# Patient Record
Sex: MALE | Race: WHITE | NOT HISPANIC OR LATINO | Employment: OTHER | URBAN - METROPOLITAN AREA
[De-identification: names, ages, dates, MRNs, and addresses within clinical notes are randomized per-mention and may not be internally consistent; named-entity substitution may affect disease eponyms.]

---

## 2021-07-27 ENCOUNTER — OFFICE VISIT (OUTPATIENT)
Dept: NEUROSURGERY | Facility: CLINIC | Age: 46
End: 2021-07-27
Payer: MEDICARE

## 2021-07-27 VITALS
RESPIRATION RATE: 16 BRPM | DIASTOLIC BLOOD PRESSURE: 86 MMHG | SYSTOLIC BLOOD PRESSURE: 140 MMHG | HEART RATE: 91 BPM | TEMPERATURE: 97.6 F | BODY MASS INDEX: 41.52 KG/M2 | WEIGHT: 290 LBS | HEIGHT: 70 IN

## 2021-07-27 DIAGNOSIS — G62.9 NEUROPATHY: Primary | ICD-10-CM

## 2021-07-27 DIAGNOSIS — M54.50 LOWER BACK PAIN: ICD-10-CM

## 2021-07-27 PROBLEM — R20.0 NUMBNESS: Status: ACTIVE | Noted: 2021-07-27

## 2021-07-27 PROBLEM — G89.29 CHRONIC MIDLINE LOW BACK PAIN WITHOUT SCIATICA: Status: ACTIVE | Noted: 2021-07-27

## 2021-07-27 PROCEDURE — 99204 OFFICE O/P NEW MOD 45 MIN: CPT | Performed by: PHYSICIAN ASSISTANT

## 2021-07-27 RX ORDER — TRAMADOL HYDROCHLORIDE 50 MG/1
1 TABLET ORAL EVERY 6 HOURS PRN
COMMUNITY
Start: 2021-07-18

## 2021-07-27 RX ORDER — GABAPENTIN 600 MG/1
600 TABLET ORAL 3 TIMES DAILY
COMMUNITY
Start: 2021-07-01

## 2021-07-27 RX ORDER — IBUPROFEN 200 MG
200 TABLET ORAL EVERY 6 HOURS PRN
COMMUNITY

## 2021-07-27 NOTE — ASSESSMENT & PLAN NOTE
Given presentation of ascending numbness in all extremities etiology unclear  · Neurology consult placed  · EMG of bilateral upper and lower extremities ordered   · Discuss consideration for B12, folate and A1c  Patient denies history of diabetes but has not had any recent blood work

## 2021-07-27 NOTE — ASSESSMENT & PLAN NOTE
Presents as new patient for low back pain consult  · Progressively worsening back pain x 5 years from onset  · History of ALIF L5-S1 fusion in April of 2018  · Associated ascending numbness and paresthesia in upper extremities up to elbow and lower extremities up to mid calf  · Associated with pain limiting weakness in the left lower extremity  Imaging:   · MRI Lumbar spine w w/o contrast:  Multilevel degenerative changes  Most noted L3-4 and L4-5  Mild disc bulges with moderate facet hypertrophy  Bilateral foraminal stenosis  No significant central stenosis  Postoperative changes noted at L5-S1  Plan:   · Continue monitor neurological symptoms  · MRI imaging reviewed in detail with patient  Normal spinal anatomy reviewed  · Discuss etiology for patient's back pain likely multifactorial   Patient educated on facet arthropathy and facet syndrome which is likely contributing to his back pain  · Discussed weight management to help reduce the weight bearing load on his spine  · Discussed the establishment of a PCP provider to monitor blood levels of B12, ferritin, and HA1C to check for other causes of neuropathy  · Obtain lumbar flexion-extension x-rays to evaluate for any instability  · Recommend further follow up with pain management to discuss possible further interventions such as radioablation of the vertebral facet  · Recommended ongoing therapy exercises  Discuss consideration for aqua therapy  · Patient may follow up as a joint appointment in approximately six weeks after trial of conservative management and for review of lumbar flexion-extension x-rays

## 2021-07-27 NOTE — PROGRESS NOTES
Neurosurgery Office Note  Chase Curiel 48 y o  male MRN: [id number]      Assessment/Plan     Chronic midline low back pain without sciatica  Presents as new patient for low back pain consult  · Progressively worsening back pain x 5 years from onset  · History of ALIF L5-S1 fusion in April of 2018  · Associated ascending numbness and paresthesia in upper extremities up to elbow and lower extremities up to mid calf  · Associated with pain limiting weakness in the left lower extremity  Imaging:   · MRI Lumbar spine w w/o contrast:  Multilevel degenerative changes  Most noted L3-4 and L4-5  Mild disc bulges with moderate facet hypertrophy  Bilateral foraminal stenosis  No significant central stenosis  Postoperative changes noted at L5-S1  Plan:   · Continue monitor neurological symptoms  · MRI imaging reviewed in detail with patient  Normal spinal anatomy reviewed  · Discuss etiology for patient's back pain likely multifactorial   Patient educated on facet arthropathy and facet syndrome which is likely contributing to his back pain  · Discussed weight management to help reduce the weight bearing load on his spine  · Discussed the establishment of a PCP provider to monitor blood levels of B12, ferritin, and HA1C to check for other causes of neuropathy  · Obtain lumbar flexion-extension x-rays to evaluate for any instability  · Recommend further follow up with pain management to discuss possible further interventions such as radioablation of the vertebral facet  · Recommended ongoing therapy exercises  Discuss consideration for aqua therapy  · Patient may follow up as a joint appointment in approximately six weeks after trial of conservative management and for review of lumbar flexion-extension x-rays  Numbness  Given presentation of ascending numbness in all extremities etiology unclear      · Neurology consult placed  · EMG of bilateral upper and lower extremities ordered   · Discuss consideration for B12, folate and A1c  Patient denies history of diabetes but has not had any recent blood work  Diagnoses and all orders for this visit:    Neuropathy  -     EMG 2 Limb Upper Extremity; Future  -     EMG 2 limb lower extremity; Future  -     Ambulatory referral to Neurology; Future    Lower back pain  -     Ambulatory referral to Neurosurgery  -     Ambulatory referral to Pain Management; Future  -     X-ray lumbar spine flexion and extension only 4+ views; Future    Other orders  -     gabapentin (NEURONTIN) 600 MG tablet; Take 600 mg by mouth 3 (three) times a day  -     traMADol (ULTRAM) 50 mg tablet; Take 1 tablet by mouth every 6 (six) hours as needed  -     ibuprofen (MOTRIN) 200 mg tablet; Take 200 mg by mouth every 6 (six) hours as needed            CHIEF COMPLAINT    Chief Complaint   Patient presents with    Consult       HISTORY    History of Present Illness       Patient is a 48year old male without significant past medical history who presents today for a consult regarding his progressive lower back pain  His back pain started approximately 5 years ago when he slipped on ice in a freezer at working as a   He did not seek medical attention right after the incident hoping that his pain would resolve  He had followed with [facility] and ultimately underwent anterior L5-S1 vertebral fusion in April 2018  He tried returning to work a few months later in the beginning of 2019, but the back pain kept his from doing so  He has tried physical therapy and epidural steroid injections without relief  Currently, his low back pain is constant, sharp, rated 7/10 severity most days, provoked by movement  He takes 4 tramadol, 3 Advil, and 1 aleve a day to help relieve some of the pain  He has tried gabapentin 600 mg TID for the past couple of months and continues to note progressive numbness of his extremities   In the summer of 2019, he originally started with mild tingling of his toes and fingertips  This progressed to numbness and tingling in both of his hands the whole way up to his elbows, and in both of his feet the whole way up to his mid calf  He states that he is not aware of any specific events that exacerbated these symptoms  These episodes of numbness happen sporadically a couple of times and hour and there is nothing to do to alleviate the feeling  He denies pain in his thighs, upper arms, upper back and abdomen  Denies headache, constipation, diarrhea, incontinence, saddle anesthesia, or difficulty controlling bowel/bladder function  He states that the pain has been pretty consistent and the numbness is seeming to get worse  REVIEW OF SYSTEMS    Review of Systems   Constitutional: Negative  HENT: Negative  Eyes: Negative  Respiratory: Negative  Cardiovascular: Negative  Gastrointestinal: Negative  Negative for constipation and diarrhea  Endocrine: Negative  Genitourinary: Negative  Negative for difficulty urinating  Musculoskeletal: Positive for back pain (mid to lower back pain down into bilateral hips and legs), gait problem (sometimes feels unsteady, legs will give out on him) and myalgias (muscle pain in his back and bilateral legs, muscle pain in arms and hands)  Negative for neck pain and neck stiffness  Skin: Negative  Allergic/Immunologic: Negative  Neurological: Positive for weakness (legs will give out on him sometimes  ) and numbness (legs feel like they want to fall asleep but now its just pain  numbness in hands up into his elbow)  Negative for dizziness, tremors, seizures and headaches  Psychiatric/Behavioral: Negative  I have reviewed and agreed with the ROS      Meds/Allergies     Current Outpatient Medications   Medication Sig Dispense Refill    gabapentin (NEURONTIN) 600 MG tablet Take 600 mg by mouth 3 (three) times a day      ibuprofen (MOTRIN) 200 mg tablet Take 200 mg by mouth every 6 (six) hours as needed      traMADol (ULTRAM) 50 mg tablet Take 1 tablet by mouth every 6 (six) hours as needed       No current facility-administered medications for this visit  No Known Allergies    PAST HISTORY    History reviewed  No pertinent past medical history  Past Surgical History:   Procedure Laterality Date    LUMBAR FUSION  2017    at [facility]       Social History     Tobacco Use    Smoking status: Current Every Day Smoker     Types: Cigars    Smokeless tobacco: Current User     Types: Chew    Tobacco comment: about 5 cigars a day   Vaping Use    Vaping Use: Never used   Substance Use Topics    Alcohol use: Not Currently    Drug use: Yes     Types: Marijuana     Comment: medical marijuana       History reviewed  No pertinent family history  Above history personally reviewed  EXAM    Vitals:Blood pressure 140/86, pulse 91, temperature 97 6 °F (36 4 °C), temperature source Tympanic Core, resp  rate 16, height 5' 10" (1 778 m), weight 132 kg (290 lb)  ,Body mass index is 41 61 kg/m²  Physical Exam  Constitutional:       General: He is not in acute distress  Appearance: Normal appearance  He is well-developed  He is not ill-appearing  HENT:      Head: Normocephalic and atraumatic  Right Ear: External ear normal       Left Ear: External ear normal    Eyes:      General: No scleral icterus  Right eye: No discharge  Left eye: No discharge  Conjunctiva/sclera: Conjunctivae normal    Cardiovascular:      Rate and Rhythm: Normal rate  Pulmonary:      Effort: Pulmonary effort is normal  No respiratory distress  Musculoskeletal:         General: Tenderness (diffuse lumbar spine midline and bilateral paraspinal) present  Cervical back: Normal range of motion and neck supple  Skin:     General: Skin is warm and dry  Neurological:      Mental Status: He is alert        Coordination: Finger-Nose-Finger Test normal       Gait: Gait is intact  Deep Tendon Reflexes:      Reflex Scores:       Bicep reflexes are 2+ on the right side and 2+ on the left side  Brachioradialis reflexes are 2+ on the right side and 2+ on the left side  Patellar reflexes are 2+ on the right side and 2+ on the left side  Psychiatric:         Mood and Affect: Mood normal          Speech: Speech normal          Behavior: Behavior normal          Thought Content: Thought content normal          Judgment: Judgment normal          Neurologic Exam     Mental Status   Follows 3 step commands  Attention: normal  Concentration: normal    Speech: speech is normal   Level of consciousness: alert  Knowledge: good  Normal comprehension  Cranial Nerves     CN III, IV, VI   Conjugate gaze: present    CN VII   Facial expression full, symmetric  CN VIII   Hearing: intact    Motor Exam   Muscle bulk: normal  Right arm tone: normal  Left arm tone: normal  Right arm pronator drift: absent  Left arm pronator drift: absent  Right leg tone: normal  Left leg tone: normal    Strength   Right deltoid: 5/5  Left deltoid: 5/5  Right biceps: 5/5  Left biceps: 5/5  Right triceps: 5/5  Left triceps: 5/5  Right quadriceps: 5/5  Left quadriceps: 4/5  Right hamstrin/5  Left hamstrin/5  Right anterior tibial: 5/5  Left anterior tibial: 5/5  Right posterior tibial: 5/5  Left posterior tibial: 5/5  Right gastroc: 5/5  Left gastroc: 5/5  Mild left lower extremity pain inhibition - HF 4/5      Sensory Exam   Right arm light touch: normal  Left arm light touch: normal  Right leg light touch: normal  Left leg light touch: normal  Right arm vibration: normal  Left arm vibration: normal  Right leg vibration: normal  Left leg vibration: normal  Right arm pinprick: normal  Left arm pinprick: normal  Right leg pinprick: normal  Left leg pinprick: normal  Can localize location of vibratory sensation        Gait, Coordination, and Reflexes     Gait  Gait: normal    Coordination Finger to nose coordination: normal    Tremor   Resting tremor: absent  Intention tremor: absent  Action tremor: absent    Reflexes   Right brachioradialis: 2+  Left brachioradialis: 2+  Right biceps: 2+  Left biceps: 2+  Right patellar: 2+  Left patellar: 2+  Right : 2+  Left : 2+  Right Pat: absent  Left Pat: absent  Right ankle clonus: absent  Left pendular knee jerk: absent        MEDICAL DECISION MAKING    Imaging Studies:     MRI lumbar spine with without contrast November 2, 2020    I have personally reviewed pertinent reports     and I have personally reviewed pertinent films in PACS

## 2021-07-30 ENCOUNTER — TELEPHONE (OUTPATIENT)
Dept: NEUROSURGERY | Facility: CLINIC | Age: 46
End: 2021-07-30

## 2021-07-30 NOTE — TELEPHONE ENCOUNTER
I called back good shep spoke with lluvia [phone]  gave them pt phone number and medicare info she will put in computer - I dont know what they have from us  because all demos are on emg referral advsed they can call back if anything else is needed _BA

## 2021-07-30 NOTE — TELEPHONE ENCOUNTER
Sid shesharlene called asking for demographics for pt for emg - I advised it was all on script - wes checked pt out they received fax order from us but states none of pt info was on it ?  but states no info on script ?  I offered to give her pt number etc she declined and states she will call back -ba

## 2021-08-27 ENCOUNTER — CONSULT (OUTPATIENT)
Dept: PAIN MEDICINE | Facility: CLINIC | Age: 46
End: 2021-08-27
Payer: MEDICARE

## 2021-08-27 VITALS
BODY MASS INDEX: 41.09 KG/M2 | DIASTOLIC BLOOD PRESSURE: 78 MMHG | SYSTOLIC BLOOD PRESSURE: 122 MMHG | WEIGHT: 287 LBS | HEART RATE: 90 BPM | HEIGHT: 70 IN

## 2021-08-27 DIAGNOSIS — R20.2 NUMBNESS AND TINGLING IN BOTH HANDS: ICD-10-CM

## 2021-08-27 DIAGNOSIS — R20.0 NUMBNESS AND TINGLING IN BOTH HANDS: ICD-10-CM

## 2021-08-27 DIAGNOSIS — M51.36 DDD (DEGENERATIVE DISC DISEASE), LUMBAR: ICD-10-CM

## 2021-08-27 DIAGNOSIS — M48.061 LUMBAR FORAMINAL STENOSIS: ICD-10-CM

## 2021-08-27 DIAGNOSIS — M54.40 LOW BACK PAIN WITH SCIATICA, SCIATICA LATERALITY UNSPECIFIED, UNSPECIFIED BACK PAIN LATERALITY, UNSPECIFIED CHRONICITY: ICD-10-CM

## 2021-08-27 DIAGNOSIS — M47.816 LUMBAR SPONDYLOSIS: ICD-10-CM

## 2021-08-27 DIAGNOSIS — G89.4 CHRONIC PAIN SYNDROME: ICD-10-CM

## 2021-08-27 DIAGNOSIS — M54.16 LUMBAR RADICULOPATHY: Primary | ICD-10-CM

## 2021-08-27 PROCEDURE — 99204 OFFICE O/P NEW MOD 45 MIN: CPT | Performed by: ANESTHESIOLOGY

## 2021-08-27 NOTE — PROGRESS NOTES
Assessment  1  Lumbar radiculopathy    2  Low back pain with sciatica, sciatica laterality unspecified, unspecified back pain laterality, unspecified chronicity    3  Lumbar foraminal stenosis    4  Numbness and tingling in both hands    5  Lumbar spondylosis    6  DDD (degenerative disc disease), lumbar    7  Chronic pain syndrome        Plan   53-year-old male with a history of L5-S1 fusion, referred by ALVIN Abreu PA-C of Neurosurgery, presenting for initial consultation regarding lumbosacral back pain that radiates into bilateral lower extremities with associated numbness, paresthesias, and subjective weakness to the feet  He also complains of numbness and paresthesias as well in the upper extremities  He denies any neck pain  He has been dealing with the symptoms for about 6-1/2 years  MRI of the lumbar spine shows stable postoperative changes at L5-S1  He has multilevel degenerative disc disease and facet arthrosis  Bilateral foraminal stenosis noted at L4-5 and L5-S1  The patient did have an EMG of his upper extremities and 2020 which showed carpal tunnel syndrome on the right  Neurosurgery did order EMG of the upper and lower extremities which he is in the process of scheduling  The patient does follow with Dr Radha King at [facility] for pain management  He has received numerous epidural steroid injections which do provide some transient relief  Physical therapy has not provided any relief  The patient does take tramadol 50 mg q 6 hours p r n  and gabapentin 600 mg t i d  which provides some relief  He also uses medical marijuana with some relief  The patient's low back pain seems to be multifactorial including myofascial and facet mediated components  No evidence of sacroiliitis  Lower extremity symptoms do appear to be radicular in nature although there may be a component of peripheral neuropathy as well  Upper extremity symptoms are likely carpal tunnel syndrome    Unlikely to be cervical radiculopathy considering absence of neck pain  Fortunately reflexes and strength are preserved in the upper and lower extremities  I did discuss with the patient that he has been referred by Neurosurgery to consider lumbar rhizotomy to address the patient's low back pain  This will not address his radicular symptoms in the lower extremities  The patient is already established with a pain specialist who is managing him with medical marijuana, chronic opioid therapy, and injections and my suggestion is to follow-up with his current pain specialist   We do not prescribe opioid medications to patients on concurrent medical marijuana therapy  Furthermore, the patient is interested in disability and we do not fill out any disability paperwork or do work restrictions  He is currently following with a physiatrist who may be able to help him from this aspect as well  The patient will follow-up with us on a p r n  basis  My impressions and treatment recommendations were discussed in detail with the patient who verbalized understanding and had no further questions  Discharge instructions were provided  I personally saw and examined the patient and I agree with the above discussed plan of care  No orders of the defined types were placed in this encounter  No orders of the defined types were placed in this encounter  History of Present Illness    Mignon Henao is a 48 y o  male with a history of L5-S1 fusion, referred by ALVIN Abreu PA-C of Neurosurgery, presenting for initial consultation regarding lumbosacral back pain that radiates into bilateral lower extremities with associated numbness, paresthesias, and subjective weakness to the feet  He also complains of numbness and paresthesias as well in the upper extremities  He denies any neck pain  He has been dealing with the symptoms for about 6-1/2 years  He denies any bladder or bowel incontinence or saddle anesthesia    He denies any balance issues  MRI of the lumbar spine shows stable postoperative changes at L5-S1  He has multilevel degenerative disc disease and facet arthrosis  Bilateral foraminal stenosis noted at L4-5 and L5-S1  The patient did have an EMG of his upper extremities and 2020 which showed carpal tunnel syndrome on the right  Neurosurgery did order EMG of the upper and lower extremities which he is in the process of scheduling  The patient does follow with Dr Jeffery Price at [address] for pain management  He has received numerous epidural steroid injections which do provide some transient relief  Physical therapy has not provided any relief  The patient does take tramadol 50 mg q 6 hours p r n  and gabapentin 600 mg t i d  which provides some relief  He also uses medical marijuana with some relief  The patient rates his pain an 8 to 9/10 on the pain is constant  The pain is worse in the afternoon, evening, and at night  The pain is described as burning, cramping, shooting, numbness, sharp, cutting, pins and needles, pressure like, throbbing, dull, and aching  The pain is increased with lying down, standing, bending, sitting, walking, exercise, relaxation, coughing, sneezing, and bowel movements  He does find some relief with heat and ice application  Other than as stated above, the patient denies any interval changes in medications, medical condition, mental condition, symptoms, or allergies since the last office visit  I have personally reviewed and/or updated the patient's past medical history, past surgical history, family history, social history, current medications, allergies, and vital signs today  Review of Systems   Constitutional: Negative for fever and unexpected weight change  HENT: Positive for hearing loss  Negative for trouble swallowing  Eyes: Positive for visual disturbance  Respiratory: Negative for shortness of breath and wheezing  Cardiovascular: Positive for leg swelling   Negative for chest pain and palpitations  Gastrointestinal: Negative for constipation, diarrhea, nausea and vomiting  Endocrine: Negative for cold intolerance, heat intolerance and polydipsia  Genitourinary: Negative for difficulty urinating and frequency  Musculoskeletal: Positive for arthralgias and myalgias  Negative for gait problem and joint swelling  Skin: Negative for rash  Neurological: Positive for dizziness, numbness and headaches  Negative for seizures, syncope and weakness  Hematological: Does not bruise/bleed easily  Psychiatric/Behavioral: Positive for decreased concentration  Negative for dysphoric mood  Anxiety   All other systems reviewed and are negative  Patient Active Problem List   Diagnosis    Numbness    Chronic midline low back pain without sciatica       No past medical history on file  Past Surgical History:   Procedure Laterality Date    LUMBAR FUSION  2017    at [facility]       No family history on file  Social History     Occupational History    Not on file   Tobacco Use    Smoking status: Current Every Day Smoker     Types: Cigars    Smokeless tobacco: Current User     Types: Chew    Tobacco comment: about 5 cigars a day   Vaping Use    Vaping Use: Never used   Substance and Sexual Activity    Alcohol use: Not Currently    Drug use: Yes     Types: Marijuana     Comment: medical marijuana    Sexual activity: Not on file       Current Outpatient Medications on File Prior to Visit   Medication Sig    gabapentin (NEURONTIN) 600 MG tablet Take 600 mg by mouth 3 (three) times a day    ibuprofen (MOTRIN) 200 mg tablet Take 200 mg by mouth every 6 (six) hours as needed    traMADol (ULTRAM) 50 mg tablet Take 1 tablet by mouth every 6 (six) hours as needed     No current facility-administered medications on file prior to visit  No Known Allergies    Physical Exam    There were no vitals taken for this visit      Constitutional: normal, well developed, well nourished, alert, in no distress and non-toxic and no overt pain behavior  Eyes: anicteric  HEENT: grossly intact  Neck: supple, symmetric, trachea midline and no masses   Pulmonary:even and unlabored  Cardiovascular:No edema or pitting edema present  Skin:Normal without rashes or lesions and well hydrated  Psychiatric:Mood and affect appropriate  Neurologic:Cranial Nerves II-XII grossly intact  Musculoskeletal:normalGait  Bilateral cervical paraspinals and trapezii nontender to palpation  Full range of motion of cervical spine in all planes  Bilateral biceps, triceps, brachioradialis, patellar, and Achilles reflexes were 2/4 and symmetrical   No clonus was noted bilaterally  Negative Pat's reflex bilaterally  Bilateral upper extremity strength 5/5 in all muscle groups  Sensation intact to light touch in C5 through T1 dermatomes bilaterally  Negative Spurling's bilaterally  Positive Tinel's over bilateral wrists  Bilateral lumbar paraspinals tender to palpation  Bilateral SI joints and trochanteric flares nontender to palpation  Bilateral lower extremity strength 5/5 in all muscle groups  Sensation intact to light touch in L3 through S1 dermatomes bilaterally  Negative straight leg raise bilaterally  Negative Ezio's test bilaterally  Imaging        PACS Images     Show images for MRI lumbar spine w wo contrast   Study Result    Narrative & Impression   1 [id number]  2 [id number]   Imaging    MRI lumbar spine w wo contrast (Order: [id number]) - 7/27/2021  Order Report     Order Details

## 2021-08-27 NOTE — PATIENT INSTRUCTIONS
Chronic Back Pain   WHAT YOU NEED TO KNOW:   What is chronic back pain? Chronic back pain is back pain that lasts 3 months or longer  This may include pain that has not been controlled or does not improve with treatment  Your back pain may cause weakness or pain that spreads to your arms or legs  What causes or increases my risk for chronic back pain? Conditions that affect the spine, joints, or muscles can cause back pain  These may include arthritis, spinal stenosis (narrowing of the spinal column), muscle tension, or breakdown of the spinal discs  The following increase your risk for back pain:  · Aging    · Lack of regular physical activity     · Repeated bending, lifting, or twisting, or lifting heavy items    · Obesity or pregnancy     · Injury from a fall or accident    · Driving, sitting, or standing for long periods    · Bad posture while sitting or standing    How is chronic back pain diagnosed? Your healthcare provider will ask if you have any medical conditions  He or she may ask if you have a history of back pain and how it started  He or she may watch you stand and walk, and check your range of motion  Show him or her where you feel pain and what makes it better or worse  Describe the pain, how bad it is, and how long it lasts  Tell your provider if your pain worsens at night or when you lie on your back  How is chronic back pain treated? · NSAIDs  help decrease swelling and pain or fever  This medicine is available with or without a doctor's order  NSAIDs can cause stomach bleeding or kidney problems in certain people  If you take blood thinner medicine, always ask your healthcare provider if NSAIDs are safe for you  Always read the medicine label and follow directions  · Acetaminophen  decreases pain and fever  It is available without a doctor's order  Ask how much to take and how often to take it  Follow directions   Read the labels of all other medicines you are using to see if they also contain acetaminophen, or ask your doctor or pharmacist  Acetaminophen can cause liver damage if not taken correctly  Do not use more than 4 grams (4,000 milligrams) total of acetaminophen in one day  · Prescription pain medicine  called narcotics or opioids may be given for certain types of chronic pain  Ask your healthcare provider how to take this medicine safely  · Muscle relaxers  help decrease pain and muscle spasms  · Steroids  decrease inflammation that causes pain  · Anesthetic  medicines may be injected in or around a nerve to block pain signals from the nerves  · Antidepressants  may be used to help decrease or prevent the symptoms of depression or anxiety  They are also used to treat nerve pain  How can I manage my symptoms? · Apply ice for 15 to 20 minutes every hour, or as directed  Use an ice pack, or put crushed ice in a plastic bag  Cover it with a towel before you apply it to your skin  Ice decreases pain and helps prevent tissue damage  · Apply heat for 20 to 30 minutes every 2 hours, or as directed  Heat helps decrease pain and muscle spasms  · Use massage to loosen tense muscles  Massage may relieve back pain caused by tight muscles  Regular massages may help prevent this kind of back pain  · Ask about acupuncture for pain relief  Back pain is sometimes relieved with acupuncture  Talk to your healthcare provider before you get this treatment to make sure it is safe for you  What else can I do to relieve or prevent back pain? · Manage stress  Stress can cause back pain or make it worse  Some ways to reduce stress are listening to music, meditating, or using aromatherapy  It may help to talk with a therapist about anything that is causing you stress  Your healthcare provider can give you more information  · Stay active as much as you can without causing more pain  Ask your healthcare provider what exercises are right for you   Do not sit or lie down for long periods  This could make your back pain worse  Yoga or similar gentle movements may help relieve pain and tension in your back  Go slowly and do not strain your back as you do any movement  · Be careful when you lift heavy objects  Do not lift anything heavy until your pain is gone  Never strain your back when you lift a heavy item  If possible, ask someone to help you  · Go to physical therapy as directed  A physical therapist can teach you exercises to help improve movement and strength, and to decrease pain  When should I call my doctor? · You have severe pain  · You have new numbness, tingling, or weakness, especially in your lower back, legs, arms, or genital area  · You lose control of your bladder or bowel movements  · You have a fever or sudden weight loss  · You have new or worse pain  · You have questions or concerns about your condition or care  CARE AGREEMENT:   You have the right to help plan your care  Learn about your health condition and how it may be treated  Discuss treatment options with your healthcare providers to decide what care you want to receive  You always have the right to refuse treatment  The above information is an  only  It is not intended as medical advice for individual conditions or treatments  Talk to your doctor, nurse or pharmacist before following any medical regimen to see if it is safe and effective for you  © Copyright [employer] 2021 Information is for End User's use only and may not be sold, redistributed or otherwise used for commercial purposes   All illustrations and images included in CareNotes® are the copyrighted property of A D A M , Inc  or [address] [place] [address] [employer]

## 2021-09-02 ENCOUNTER — TELEMEDICINE (OUTPATIENT)
Dept: NEUROLOGY | Facility: CLINIC | Age: 46
End: 2021-09-02
Payer: MEDICARE

## 2021-09-02 VITALS — WEIGHT: 287 LBS | BODY MASS INDEX: 41.09 KG/M2 | HEIGHT: 70 IN

## 2021-09-02 DIAGNOSIS — G89.4 CHRONIC PAIN SYNDROME: ICD-10-CM

## 2021-09-02 DIAGNOSIS — R20.2 PARESTHESIA OF SKIN: ICD-10-CM

## 2021-09-02 DIAGNOSIS — G60.9 HEREDITARY AND IDIOPATHIC NEUROPATHY, UNSPECIFIED: ICD-10-CM

## 2021-09-02 DIAGNOSIS — R73.03 PREDIABETES: ICD-10-CM

## 2021-09-02 DIAGNOSIS — R20.2 NUMBNESS AND TINGLING IN BOTH HANDS: ICD-10-CM

## 2021-09-02 DIAGNOSIS — R20.0 NUMBNESS AND TINGLING IN BOTH HANDS: ICD-10-CM

## 2021-09-02 DIAGNOSIS — G62.9 NEUROPATHY: Primary | ICD-10-CM

## 2021-09-02 PROCEDURE — 99204 OFFICE O/P NEW MOD 45 MIN: CPT | Performed by: PSYCHIATRY & NEUROLOGY

## 2021-09-02 NOTE — PROGRESS NOTES
Patient ID: Jordyn Jang is a 48 y o  male  Assessment/Plan:    No problem-specific Assessment & Plan notes found for this encounter  {Assess/PlanSmartLinks:08858}       Subjective:    HPI    {[facility] HPI texts:83129}    {Common ambulatory SmartLinks:07810}         Objective: There were no vitals taken for this visit  Physical Exam    Neurological Exam      ROS:    Review of Systems   Constitutional: Negative  Negative for appetite change and fever  HENT: Negative  Negative for hearing loss, tinnitus, trouble swallowing and voice change  Eyes: Negative for photophobia, pain and visual disturbance  Respiratory: Negative  Negative for shortness of breath  Cardiovascular: Negative  Negative for palpitations  Gastrointestinal: Negative  Negative for nausea and vomiting  Endocrine: Negative  Negative for cold intolerance  Genitourinary: Negative  Negative for dysuria, frequency and urgency  Musculoskeletal: Positive for neck pain  Negative for myalgias  Skin: Negative  Negative for rash  Neurological: Positive for weakness (legs), numbness and headaches  Negative for dizziness, tremors, seizures, syncope, facial asymmetry, speech difficulty and light-headedness  Hematological: Negative  Does not bruise/bleed easily  Psychiatric/Behavioral: Positive for sleep disturbance  Negative for confusion and hallucinations

## 2021-09-02 NOTE — PROGRESS NOTES
Virtual Regular Visit    Verification of patient location:    Patient is located in the following state in which I hold an active license { amb virtual patient location:[address]}      Assessment/Plan:    Problem List Items Addressed This Visit     None      Visit Diagnoses     Neuropathy                   Reason for visit is   Chief Complaint   Patient presents with    Virtual Regular Visit        Encounter provider Pushpa Bonds MD    Provider located at [address] [address]  [phone]      Recent Visits  No visits were found meeting these conditions  Showing recent visits within past 7 days and meeting all other requirements  Future Appointments  No visits were found meeting these conditions  Showing future appointments within next 150 days and meeting all other requirements       The patient was identified by name and date of birth  Jose Moore was informed that this is a telemedicine visit and that the visit is being conducted through {AMB VIRTUAL VISIT TBYZRA:60101}  {Telemedicine confidentiality :88918} {Telemedicine participants:76083}  He acknowledged consent and understanding of privacy and security of the video platform  The patient has agreed to participate and understands they can discontinue the visit at any time  Patient is aware this is a billable service  Subjective  Jose Moore is a 48 y o  male ***   HPI     No past medical history on file      Past Surgical History:   Procedure Laterality Date    LUMBAR FUSION  2017    at [facility]       Current Outpatient Medications   Medication Sig Dispense Refill    gabapentin (NEURONTIN) 600 MG tablet Take 600 mg by mouth 3 (three) times a day      ibuprofen (MOTRIN) 200 mg tablet Take 200 mg by mouth every 6 (six) hours as needed      traMADol (ULTRAM) 50 mg tablet Take 1 tablet by mouth every 6 (six) hours as needed       No current facility-administered medications for this visit  No Known Allergies    Review of Systems    Video Exam    There were no vitals filed for this visit  Physical Exam     {Time Spent:93758}    VIRTUAL VISIT DISCLAIMER      Jett Roa verbally agrees to participate in [employer]  Pt is aware that [employer] could be limited without vital signs or the ability to perform a full hands-on physical exam  Kaden Marquis understands he or the provider may request at any time to terminate the video visit and request the patient to seek care or treatment in person

## 2021-09-02 NOTE — ASSESSMENT & PLAN NOTE
Mr  Javier Blancas has had paresthesias in both upper and lower extremities over the last year, he does have chronic lower back pain and more recently has had neck pain  Denies any weakness  Exam could not be performed today, as this was a virtual visit  Based on his history, symptoms are suggestive of  peripheral neuropathy  He does not have DM, however has a BMI of 41, metabolic syndrome could be likely etiology  I have recommended blood work for common reversible causes of neuropathy, he has electrodiagnostic [facility] ordered for him[facility]  I have asked him to have those forwarded to me for my review, and we will see him post [facility] and blood work for an in person visit for further assessment  If EMG's are normal, may need cervical spine imaging to rule out a demyelinating pathology, or a skin biopsy to evaluate for small fiber neuropathy  Thank you for allowing me to participate in his care

## 2021-09-02 NOTE — PROGRESS NOTES
Virtual Regular Visit    Verification of patient location:    Patient is located in the following state in which I hold an active license [place]      Assessment/Plan:    Problem List Items Addressed This Visit        Other    Numbness and tingling in both hands    Chronic pain syndrome    Paresthesia of skin      Mr Gaudencio Alejandre has had paresthesias in both upper and lower extremities over the last year, he does have chronic lower back pain and more recently has had neck pain  Denies any weakness  Exam could not be performed today, as this was a virtual visit  Based on his history, symptoms are suggestive of  peripheral neuropathy  He does not have DM, however has a BMI of 41, metabolic syndrome could be likely etiology  I have recommended blood work for common reversible causes of neuropathy, he has electrodiagnostic studies ordered for him, and scheduled at [facility] within the next month  I have asked him to have those forwarded to me for my review, and we will see him post [facility] and blood work for an in person visit for further assessment  If EMG's are normal, may need cervical spine imaging to rule out a demyelinating pathology, or a skin biopsy to evaluate for small fiber neuropathy  Thank you for allowing me to participate in his care              Relevant Orders    Vitamin B12      Other Visit Diagnoses     Neuropathy    -  Primary    Relevant Orders    Sedimentation rate, automated    Vitamin B12    Methylmalonic acid, serum    Vitamin B6    PEDRITO Screen w/ Reflex to Titer/Pattern    Sjogren's Antibodies    Protein electrophoresis, serum    HEMOGLOBIN A1C W/ EAG ESTIMATION    Hereditary and idiopathic neuropathy, unspecified         Relevant Orders    Vitamin B6    Prediabetes         Relevant Orders    HEMOGLOBIN A1C W/ EAG ESTIMATION               Reason for visit is   Chief Complaint   Patient presents with    Virtual Regular Visit        Encounter provider Koby Healy MD    Provider located at [facility]  [facility] [address]  [place] [address] [address] [facility] [address] [phone]      Recent Visits  No visits were found meeting these conditions  Showing recent visits within past 7 days and meeting all other requirements  Today's Visits  Date Type Provider Dept   09/02/21 Telemedicine Nazario Mulligan MD [facility] today's visits and meeting all other requirements  Future Appointments  No visits were found meeting these conditions  Showing future appointments within next 150 days and meeting all other requirements       The patient was identified by name and date of birth  Bianca Armenta was informed that this is a telemedicine visit and that the visit is being conducted through [address] Now and patient was informed that this is a secure, HIPAA-compliant platform  He agrees to proceed     My office door was closed  No one else was in the room  and The patient was notified the following individuals were present in the room (MS-4, Sriram Matson)  He acknowledged consent and understanding of privacy and security of the video platform  The patient has agreed to participate and understands they can discontinue the visit at any time  Patient is aware this is a billable service  Subjective  Bianca Armenta is a 48 y o  male here for evaluation for paresthesias in upper and lower extremities  HPI     I had the pleasure of seeing your patient in [facility] for neuromuscular consultation  As you know, he is a 49-year-old man with paresthesias in upper and lower extremities  Please allow me to summarize his history for the record  He is here for paresthesias in both upper and lower extremities  He reports all these symptoms have been going on since summer of 2020, started with tingling sensation in the toes and fingers  Over the last year, symptoms have progressed involving up the forearms and lower legs   Symptoms are there most times of the day, gets worse periodically  No viral prodrome or illness that would have precipitated this  Does have lower back pain, started in 2017, has had lower spine surgery in 2017  Neck pain is rather recent, in the last month or so  Feels legs are weak, diffuse, left leg does give out on him  Right leg used to give out on him prior to the surgery  Has urinary urgency, no accidents  No bowel issues  Bladder issues have been since his surgery in 2017  Arms are not as weak as the legs  No muscle cramps or spasms  Occasional headaches  Can ambulate without assistive devices  Saw neurosurgery recently, sent to him to us and pain management  Has been following with Phsyiatry and orthopedics at , has had ADILENE's in the past      No DM, no family hx of DM  Smoker, smokes few cigars a day  No alcohol, does use medical marijuana  No past medical history on file  Past Surgical History:   Procedure Laterality Date    LUMBAR FUSION  2017    at [facility]       Current Outpatient Medications   Medication Sig Dispense Refill    gabapentin (NEURONTIN) 600 MG tablet Take 600 mg by mouth 3 (three) times a day      ibuprofen (MOTRIN) 200 mg tablet Take 200 mg by mouth every 6 (six) hours as needed      traMADol (ULTRAM) 50 mg tablet Take 1 tablet by mouth every 6 (six) hours as needed       No current facility-administered medications for this visit  No Known Allergies    Review of Systems   I reviewed the below ROS and what is mentioned in HPI, the remainder of ROS was negative  Review of Systems   Constitutional: Negative  Negative for appetite change and fever  HENT: Negative  Negative for hearing loss, tinnitus, trouble swallowing and voice change  Eyes: Negative for photophobia, pain and visual disturbance  Respiratory: Negative  Negative for shortness of breath  Cardiovascular: Negative  Negative for palpitations  Gastrointestinal: Negative  Negative for nausea and vomiting  Endocrine: Negative  Negative for cold intolerance  Genitourinary: Negative  Negative for dysuria, frequency and urgency  Musculoskeletal: Positive for neck pain  Negative for myalgias  Skin: Negative  Negative for rash  Neurological: Positive for weakness (legs), numbness and headaches  Negative for dizziness, tremors, seizures, syncope, facial asymmetry, speech difficulty and light-headedness  Hematological: Negative  Does not bruise/bleed easily  Psychiatric/Behavioral: Positive for sleep disturbance  Negative for confusion and hallucinations         Video Exam    Vitals:    09/02/21 0802   Weight: 130 kg (287 lb)   Height: 5' 10" (1 778 m)       Physical Exam   Brief physical exam was performed through this video platform  Patient was awake, and alert  Speech was fluent  Breathing comfortably on room air  No facial asymmetry, normal EOMI  Moving all 4 extremities without any issues  I spent 30 minutes directly with the patient during this visit    VIRTUAL VISIT DISCLAIMER      Amando Cordon verbally agrees to participate in [employer]  Pt is aware that [employer] could be limited without vital signs or the ability to perform a full hands-on physical exam  Kaden Crowell understands he or the provider may request at any time to terminate the video visit and request the patient to seek care or treatment in person

## 2021-09-03 ENCOUNTER — TELEPHONE (OUTPATIENT)
Dept: NEUROSURGERY | Facility: CLINIC | Age: 46
End: 2021-09-03

## 2021-09-07 ENCOUNTER — TELEPHONE (OUTPATIENT)
Dept: NEUROSURGERY | Facility: CLINIC | Age: 46
End: 2021-09-07

## 2021-09-07 NOTE — TELEPHONE ENCOUNTER
9/7/21- LMOM TO R/S TODAYS APT (6WK F/U) PT NEEDS TO HAVE XRAYS AND EMG PRIOR   EMG CURRENTLY SCHEDULED FOR 3/2/22

## 2021-10-07 ENCOUNTER — TELEPHONE (OUTPATIENT)
Dept: NEUROSURGERY | Facility: CLINIC | Age: 46
End: 2021-10-07

## 2021-10-11 ENCOUNTER — HOSPITAL ENCOUNTER (OUTPATIENT)
Dept: RADIOLOGY | Facility: HOSPITAL | Age: 46
Discharge: HOME | End: 2021-10-11
Payer: MEDICARE

## 2021-10-11 DIAGNOSIS — M54.50 LOWER BACK PAIN: ICD-10-CM

## 2021-10-11 PROCEDURE — 72120 X-RAY BEND ONLY L-S SPINE: CPT

## 2021-10-14 ENCOUNTER — OFFICE VISIT (OUTPATIENT)
Dept: NEUROSURGERY | Facility: CLINIC | Age: 46
End: 2021-10-14
Payer: MEDICARE

## 2021-10-14 VITALS
DIASTOLIC BLOOD PRESSURE: 85 MMHG | HEART RATE: 82 BPM | WEIGHT: 292.2 LBS | TEMPERATURE: 97.3 F | RESPIRATION RATE: 18 BRPM | SYSTOLIC BLOOD PRESSURE: 131 MMHG | BODY MASS INDEX: 41.83 KG/M2 | HEIGHT: 70 IN

## 2021-10-14 DIAGNOSIS — R20.0 NUMBNESS AND TINGLING IN BOTH HANDS: ICD-10-CM

## 2021-10-14 DIAGNOSIS — G89.29 CHRONIC MIDLINE LOW BACK PAIN WITHOUT SCIATICA: ICD-10-CM

## 2021-10-14 DIAGNOSIS — R20.2 PARESTHESIA OF SKIN: ICD-10-CM

## 2021-10-14 DIAGNOSIS — G89.4 CHRONIC PAIN SYNDROME: Primary | ICD-10-CM

## 2021-10-14 DIAGNOSIS — R20.2 NUMBNESS AND TINGLING IN BOTH HANDS: ICD-10-CM

## 2021-10-14 DIAGNOSIS — R20.2 NUMBNESS AND TINGLING OF BOTH LEGS: ICD-10-CM

## 2021-10-14 DIAGNOSIS — R20.0 NUMBNESS AND TINGLING OF BOTH LEGS: ICD-10-CM

## 2021-10-14 DIAGNOSIS — G62.9 PERIPHERAL NEUROPATHY: ICD-10-CM

## 2021-10-14 DIAGNOSIS — M54.50 CHRONIC MIDLINE LOW BACK PAIN WITHOUT SCIATICA: ICD-10-CM

## 2021-10-14 DIAGNOSIS — Z98.890 HISTORY OF LUMBAR SURGERY: ICD-10-CM

## 2021-10-14 PROCEDURE — 99213 OFFICE O/P EST LOW 20 MIN: CPT | Performed by: PHYSICIAN ASSISTANT

## 2021-10-19 ENCOUNTER — TELEPHONE (OUTPATIENT)
Dept: NEUROLOGY | Facility: CLINIC | Age: 46
End: 2021-10-19